# Patient Record
Sex: FEMALE
[De-identification: names, ages, dates, MRNs, and addresses within clinical notes are randomized per-mention and may not be internally consistent; named-entity substitution may affect disease eponyms.]

---

## 2021-09-25 ENCOUNTER — NURSE TRIAGE (OUTPATIENT)
Dept: OTHER | Facility: CLINIC | Age: 51
End: 2021-09-25

## 2022-05-17 ENCOUNTER — NURSE TRIAGE (OUTPATIENT)
Dept: OTHER | Facility: CLINIC | Age: 52
End: 2022-05-17

## 2022-05-17 NOTE — TELEPHONE ENCOUNTER
Subjective: Caller states \"I feel short of breath\"     Current Symptoms: Caller reports she began feeling sob 15 minutes ago and her extremities feel warm. Caller reports she is being treated for cancer at this time. Onset:15 minutes    Associated Symptoms: cancer treatment    Pain Severity: 0/10    Temperature: 97.2    What has been tried: Nothing    LMP: NA Pregnant: NA    Recommended disposition: Go to ED Now    Care advice provided, patient verbalizes understanding; denies any other questions or concerns; instructed to call back for any new or worsening symptoms. This triage is a result of a call to [address] [place] [address]. Please do not respond to the triage nurse through this encounter. Any subsequent communication should be directly with the patient.       Reason for Disposition   Cancer treatment in past six months (or has cancer now)    Protocols used: BREATHING DIFFICULTY-ADULT-AH